# Patient Record
Sex: FEMALE | Race: WHITE | NOT HISPANIC OR LATINO | Employment: FULL TIME | URBAN - METROPOLITAN AREA
[De-identification: names, ages, dates, MRNs, and addresses within clinical notes are randomized per-mention and may not be internally consistent; named-entity substitution may affect disease eponyms.]

---

## 2018-03-29 LAB — RAPID GROUP A STREP (OHS): NEGATIVE

## 2019-04-16 ENCOUNTER — HISTORICAL (OUTPATIENT)
Dept: ADMINISTRATIVE | Facility: HOSPITAL | Age: 39
End: 2019-04-16

## 2019-04-16 LAB
ABS NEUT (OLG): 4.5 X10(3)/MCL (ref 2.1–9.2)
ALBUMIN SERPL-MCNC: 4.2 GM/DL (ref 3.4–5)
ALBUMIN/GLOB SERPL: 1.83 {RATIO} (ref 1.5–2.5)
ALP SERPL-CCNC: 43 UNIT/L (ref 38–126)
ALT SERPL-CCNC: 18 UNIT/L (ref 7–52)
APPEARANCE, UA: NORMAL
AST SERPL-CCNC: 18 UNIT/L (ref 15–37)
BACTERIA #/AREA URNS AUTO: NORMAL /HPF
BILIRUB SERPL-MCNC: 0.4 MG/DL (ref 0.2–1)
BILIRUB UR QL STRIP: NEGATIVE MG/DL
BILIRUBIN DIRECT+TOT PNL SERPL-MCNC: 0.1 MG/DL (ref 0–0.5)
BILIRUBIN DIRECT+TOT PNL SERPL-MCNC: 0.3 MG/DL
BUN SERPL-MCNC: 12 MG/DL (ref 7–18)
CALCIUM SERPL-MCNC: 8.7 MG/DL (ref 8.5–10)
CHLORIDE SERPL-SCNC: 105 MMOL/L (ref 98–107)
CHOLEST SERPL-MCNC: 197 MG/DL (ref 0–200)
CHOLEST/HDLC SERPL: 2.7 {RATIO}
CO2 SERPL-SCNC: 30 MMOL/L (ref 21–32)
COLOR UR: YELLOW
CREAT SERPL-MCNC: 0.61 MG/DL (ref 0.6–1.3)
ERYTHROCYTE [DISTWIDTH] IN BLOOD BY AUTOMATED COUNT: 13.3 % (ref 11.5–17)
GLOBULIN SER-MCNC: 2.3 GM/DL (ref 1.2–3)
GLUCOSE (UA): NEGATIVE MG/DL
GLUCOSE SERPL-MCNC: 100 MG/DL (ref 74–106)
HCT VFR BLD AUTO: 41.1 % (ref 37–47)
HDLC SERPL-MCNC: 73 MG/DL (ref 35–60)
HGB BLD-MCNC: 14 GM/DL (ref 12–16)
HGB UR QL STRIP: NEGATIVE UNIT/L
KETONES UR QL STRIP: NEGATIVE MG/DL
LDLC SERPL CALC-MCNC: 93 MG/DL (ref 0–129)
LEUKOCYTE ESTERASE UR QL STRIP: NEGATIVE UNIT/L
LYMPHOCYTES # BLD AUTO: 1.4 X10(3)/MCL (ref 0.6–3.4)
LYMPHOCYTES NFR BLD AUTO: 21.5 % (ref 13–40)
MCH RBC QN AUTO: 30.1 PG (ref 27–31.2)
MCHC RBC AUTO-ENTMCNC: 34 GM/DL (ref 32–36)
MCV RBC AUTO: 88 FL (ref 80–94)
MONOCYTES # BLD AUTO: 0.7 X10(3)/MCL (ref 0.1–1.3)
MONOCYTES NFR BLD AUTO: 10.4 % (ref 0.1–24)
NEUTROPHILS NFR BLD AUTO: 68.1 % (ref 47–80)
NITRITE UR QL STRIP.AUTO: NEGATIVE
PH UR STRIP: 7 [PH]
PLATELET # BLD AUTO: 253 X10(3)/MCL (ref 130–400)
PMV BLD AUTO: 8.8 FL (ref 9.4–12.4)
POTASSIUM SERPL-SCNC: 4.5 MMOL/L (ref 3.5–5.1)
PROT SERPL-MCNC: 6.5 GM/DL (ref 6.4–8.2)
PROT UR QL STRIP: NEGATIVE MG/DL
RBC # BLD AUTO: 4.65 X10(6)/MCL (ref 4.2–5.4)
RBC #/AREA URNS HPF: NORMAL /HPF
SODIUM SERPL-SCNC: 139 MMOL/L (ref 136–145)
SP GR UR STRIP: 1.02
SQUAMOUS EPITHELIAL, UA: NORMAL /LPF
TRIGL SERPL-MCNC: 94 MG/DL (ref 30–150)
TSH SERPL-ACNC: 0.7 MIU/ML (ref 0.35–4.94)
UROBILINOGEN UR STRIP-ACNC: 0.2 MG/DL
VLDLC SERPL CALC-MCNC: 18.8 MG/DL
WBC # SPEC AUTO: 6.6 X10(3)/MCL (ref 4.5–11.5)
WBC #/AREA URNS AUTO: NORMAL /[HPF]

## 2020-06-03 ENCOUNTER — HISTORICAL (OUTPATIENT)
Dept: ADMINISTRATIVE | Facility: HOSPITAL | Age: 40
End: 2020-06-03

## 2020-06-03 LAB
ABS NEUT (OLG): 3.8 X10(3)/MCL (ref 2.1–9.2)
ALBUMIN SERPL-MCNC: 4.2 GM/DL (ref 3.4–5)
ALBUMIN/GLOB SERPL: 1.83 {RATIO} (ref 1.5–2.5)
ALP SERPL-CCNC: 65 UNIT/L (ref 38–126)
ALT SERPL-CCNC: 38 UNIT/L (ref 7–52)
APPEARANCE, UA: CLEAR
AST SERPL-CCNC: 24 UNIT/L (ref 15–37)
BACTERIA #/AREA URNS AUTO: ABNORMAL /HPF
BILIRUB SERPL-MCNC: 0.6 MG/DL (ref 0.2–1)
BILIRUB UR QL STRIP: ABNORMAL MG/DL
BILIRUBIN DIRECT+TOT PNL SERPL-MCNC: 0.1 MG/DL (ref 0–0.5)
BILIRUBIN DIRECT+TOT PNL SERPL-MCNC: 0.5 MG/DL
BUN SERPL-MCNC: 13 MG/DL (ref 7–18)
CALCIUM SERPL-MCNC: 8.8 MG/DL (ref 8.5–10)
CHLORIDE SERPL-SCNC: 106 MMOL/L (ref 98–107)
CHOLEST SERPL-MCNC: 184 MG/DL (ref 0–200)
CHOLEST/HDLC SERPL: 2.5 {RATIO}
CO2 SERPL-SCNC: 29 MMOL/L (ref 21–32)
COLOR UR: ABNORMAL
CREAT SERPL-MCNC: 0.64 MG/DL (ref 0.6–1.3)
ERYTHROCYTE [DISTWIDTH] IN BLOOD BY AUTOMATED COUNT: 13.2 % (ref 11.5–17)
GLOBULIN SER-MCNC: 2.3 GM/DL (ref 1.2–3)
GLUCOSE (UA): NEGATIVE MG/DL
GLUCOSE SERPL-MCNC: 91 MG/DL (ref 74–106)
HCT VFR BLD AUTO: 39.8 % (ref 37–47)
HDLC SERPL-MCNC: 74 MG/DL (ref 35–60)
HGB BLD-MCNC: 13.3 GM/DL (ref 12–16)
HGB UR QL STRIP: ABNORMAL UNIT/L
KETONES UR QL STRIP: ABNORMAL MG/DL
LDLC SERPL CALC-MCNC: 89 MG/DL (ref 0–129)
LEUKOCYTE ESTERASE UR QL STRIP: NEGATIVE UNIT/L
LYMPHOCYTES # BLD AUTO: 1.4 X10(3)/MCL (ref 0.6–3.4)
LYMPHOCYTES NFR BLD AUTO: 23.8 % (ref 13–40)
MCH RBC QN AUTO: 29 PG (ref 27–31.2)
MCHC RBC AUTO-ENTMCNC: 33 GM/DL (ref 32–36)
MCV RBC AUTO: 87 FL (ref 80–94)
MONOCYTES # BLD AUTO: 0.5 X10(3)/MCL (ref 0.1–1.3)
MONOCYTES NFR BLD AUTO: 9.6 % (ref 0.1–24)
NEUTROPHILS NFR BLD AUTO: 66.6 % (ref 47–80)
NITRITE UR QL STRIP.AUTO: NEGATIVE
PH UR STRIP: 6 [PH]
PLATELET # BLD AUTO: 316 X10(3)/MCL (ref 130–400)
PMV BLD AUTO: 9.4 FL (ref 9.4–12.4)
POTASSIUM SERPL-SCNC: 4.3 MMOL/L (ref 3.5–5.1)
PROT SERPL-MCNC: 6.5 GM/DL (ref 6.4–8.2)
PROT UR QL STRIP: NEGATIVE MG/DL
RBC # BLD AUTO: 4.58 X10(6)/MCL (ref 4.2–5.4)
RBC #/AREA URNS HPF: ABNORMAL /HPF
SODIUM SERPL-SCNC: 140 MMOL/L (ref 136–145)
SP GR UR STRIP: >1.03
SQUAMOUS EPITHELIAL, UA: ABNORMAL /LPF
TRIGL SERPL-MCNC: 85 MG/DL (ref 30–150)
TSH SERPL-ACNC: 1.33 MIU/ML (ref 0.35–4.94)
UROBILINOGEN UR STRIP-ACNC: 0.2 MG/DL
VLDLC SERPL CALC-MCNC: 17 MG/DL
WBC # SPEC AUTO: 5.7 X10(3)/MCL (ref 4.5–11.5)
WBC #/AREA URNS AUTO: ABNORMAL /[HPF]

## 2020-11-16 LAB
ABS NEUT (OLG): 4.47 X10(3)/MCL (ref 2.1–9.2)
ALBUMIN SERPL-MCNC: 4.2 GM/DL (ref 3.5–5)
ALBUMIN/GLOB SERPL: 1.6 RATIO (ref 1.1–2)
ALP SERPL-CCNC: 69 UNIT/L (ref 40–150)
ALT SERPL-CCNC: 21 UNIT/L (ref 0–55)
AST SERPL-CCNC: 18 UNIT/L (ref 5–34)
B-HCG SERPL QL: NEGATIVE
BASOPHILS # BLD AUTO: 0 X10(3)/MCL (ref 0–0.2)
BASOPHILS NFR BLD AUTO: 0 %
BILIRUB SERPL-MCNC: 0.6 MG/DL
BILIRUBIN DIRECT+TOT PNL SERPL-MCNC: 0.2 MG/DL (ref 0–0.5)
BILIRUBIN DIRECT+TOT PNL SERPL-MCNC: 0.4 MG/DL (ref 0–0.8)
BUN SERPL-MCNC: 12.5 MG/DL (ref 7–18.7)
CALCIUM SERPL-MCNC: 8.9 MG/DL (ref 8.4–10.2)
CHLORIDE SERPL-SCNC: 107 MMOL/L (ref 98–107)
CO2 SERPL-SCNC: 25 MMOL/L (ref 22–29)
CREAT SERPL-MCNC: 0.78 MG/DL (ref 0.55–1.02)
EOSINOPHIL # BLD AUTO: 0.1 X10(3)/MCL (ref 0–0.9)
EOSINOPHIL NFR BLD AUTO: 1 %
ERYTHROCYTE [DISTWIDTH] IN BLOOD BY AUTOMATED COUNT: 13.4 % (ref 11.5–17)
GLOBULIN SER-MCNC: 2.6 GM/DL (ref 2.4–3.5)
GLUCOSE SERPL-MCNC: 96 MG/DL (ref 74–100)
GROUP & RH: NORMAL
HCT VFR BLD AUTO: 42 % (ref 37–47)
HGB BLD-MCNC: 13.5 GM/DL (ref 12–16)
LYMPHOCYTES # BLD AUTO: 1.1 X10(3)/MCL (ref 0.6–4.6)
LYMPHOCYTES NFR BLD AUTO: 17 %
MCH RBC QN AUTO: 28.8 PG (ref 27–31)
MCHC RBC AUTO-ENTMCNC: 32.1 GM/DL (ref 33–36)
MCV RBC AUTO: 89.6 FL (ref 80–94)
MONOCYTES # BLD AUTO: 0.5 X10(3)/MCL (ref 0.1–1.3)
MONOCYTES NFR BLD AUTO: 8 %
NEUTROPHILS # BLD AUTO: 4.47 X10(3)/MCL (ref 2.1–9.2)
NEUTROPHILS NFR BLD AUTO: 72 %
PLATELET # BLD AUTO: 286 X10(3)/MCL (ref 130–400)
PMV BLD AUTO: 10 FL (ref 9.4–12.4)
POTASSIUM SERPL-SCNC: 4.7 MMOL/L (ref 3.5–5.1)
PROT SERPL-MCNC: 6.8 GM/DL (ref 6.4–8.3)
RBC # BLD AUTO: 4.69 X10(6)/MCL (ref 4.2–5.4)
SODIUM SERPL-SCNC: 141 MMOL/L (ref 136–145)
WBC # SPEC AUTO: 6.2 X10(3)/MCL (ref 4.5–11.5)

## 2020-12-10 ENCOUNTER — HISTORICAL (OUTPATIENT)
Dept: SURGERY | Facility: HOSPITAL | Age: 40
End: 2020-12-10

## 2020-12-10 LAB — GROUP & RH: NORMAL

## 2021-07-01 ENCOUNTER — HISTORICAL (OUTPATIENT)
Dept: ADMINISTRATIVE | Facility: HOSPITAL | Age: 41
End: 2021-07-01

## 2021-07-01 LAB
ABS NEUT (OLG): 3.3 X10(3)/MCL (ref 2.1–9.2)
ALBUMIN SERPL-MCNC: 4.3 GM/DL (ref 3.4–5)
ALBUMIN/GLOB SERPL: 1.79 {RATIO} (ref 1.5–2.5)
ALP SERPL-CCNC: 63 UNIT/L (ref 38–126)
ALT SERPL-CCNC: 45 UNIT/L (ref 7–52)
APPEARANCE, UA: CLEAR
AST SERPL-CCNC: 23 UNIT/L (ref 15–37)
BACTERIA #/AREA URNS AUTO: ABNORMAL /HPF
BILIRUB SERPL-MCNC: 0.6 MG/DL (ref 0.2–1)
BILIRUB UR QL STRIP: NEGATIVE MG/DL
BILIRUBIN DIRECT+TOT PNL SERPL-MCNC: 0.1 MG/DL (ref 0–0.5)
BILIRUBIN DIRECT+TOT PNL SERPL-MCNC: 0.5 MG/DL
BUN SERPL-MCNC: 11 MG/DL (ref 7–18)
CALCIUM SERPL-MCNC: 8.9 MG/DL (ref 8.5–10)
CHLORIDE SERPL-SCNC: 106 MMOL/L (ref 98–107)
CHOLEST SERPL-MCNC: 189 MG/DL (ref 0–200)
CHOLEST/HDLC SERPL: 2.4 {RATIO}
CO2 SERPL-SCNC: 27 MMOL/L (ref 21–32)
COLOR UR: YELLOW
CREAT SERPL-MCNC: 0.57 MG/DL (ref 0.6–1.3)
ERYTHROCYTE [DISTWIDTH] IN BLOOD BY AUTOMATED COUNT: 12.9 % (ref 11.5–17)
GLOBULIN SER-MCNC: 2 GM/DL (ref 1.2–3)
GLUCOSE (UA): NEGATIVE MG/DL
GLUCOSE SERPL-MCNC: 100 MG/DL (ref 74–106)
HCT VFR BLD AUTO: 41.3 % (ref 37–47)
HDLC SERPL-MCNC: 78 MG/DL (ref 35–60)
HGB BLD-MCNC: 13.5 GM/DL (ref 12–16)
HGB UR QL STRIP: ABNORMAL UNIT/L
KETONES UR QL STRIP: NEGATIVE MG/DL
LDLC SERPL CALC-MCNC: 85 MG/DL (ref 0–129)
LEUKOCYTE ESTERASE UR QL STRIP: NEGATIVE UNIT/L
LYMPHOCYTES # BLD AUTO: 1.4 X10(3)/MCL (ref 0.6–3.4)
LYMPHOCYTES NFR BLD AUTO: 26.6 % (ref 13–40)
MCH RBC QN AUTO: 29.1 PG (ref 27–31.2)
MCHC RBC AUTO-ENTMCNC: 33 GM/DL (ref 32–36)
MCV RBC AUTO: 89 FL (ref 80–94)
MONOCYTES # BLD AUTO: 0.5 X10(3)/MCL (ref 0.1–1.3)
MONOCYTES NFR BLD AUTO: 9.1 % (ref 0.1–24)
NEUTROPHILS NFR BLD AUTO: 64.3 % (ref 47–80)
NITRITE UR QL STRIP.AUTO: NEGATIVE
PH UR STRIP: 6 [PH]
PLATELET # BLD AUTO: 264 X10(3)/MCL (ref 130–400)
PMV BLD AUTO: 9.6 FL (ref 9.4–12.4)
POTASSIUM SERPL-SCNC: 4.1 MMOL/L (ref 3.5–5.1)
PROT SERPL-MCNC: 6.7 GM/DL (ref 6.4–8.2)
PROT UR QL STRIP: NEGATIVE MG/DL
RBC # BLD AUTO: 4.64 X10(6)/MCL (ref 4.2–5.4)
RBC #/AREA URNS HPF: ABNORMAL /HPF
SODIUM SERPL-SCNC: 140 MMOL/L (ref 136–145)
SP GR UR STRIP: 1.02
SQUAMOUS EPITHELIAL, UA: ABNORMAL /LPF
TRIGL SERPL-MCNC: 74 MG/DL (ref 30–150)
TSH SERPL-ACNC: 1.35 MIU/ML (ref 0.35–4.94)
UROBILINOGEN UR STRIP-ACNC: 0.2 MG/DL
VLDLC SERPL CALC-MCNC: 14.8 MG/DL
WBC # SPEC AUTO: 5.2 X10(3)/MCL (ref 4.5–11.5)
WBC #/AREA URNS AUTO: ABNORMAL /[HPF]

## 2022-04-11 ENCOUNTER — HISTORICAL (OUTPATIENT)
Dept: ADMINISTRATIVE | Facility: HOSPITAL | Age: 42
End: 2022-04-11

## 2022-04-28 VITALS
WEIGHT: 273.13 LBS | DIASTOLIC BLOOD PRESSURE: 85 MMHG | OXYGEN SATURATION: 98 % | HEIGHT: 68 IN | SYSTOLIC BLOOD PRESSURE: 126 MMHG | BODY MASS INDEX: 41.39 KG/M2

## 2022-04-30 NOTE — OP NOTE
Patient:   Margarito Krishna             MRN: [id number]            FIN: [id number]               Age:   40 years     Sex:  Female     :  1980   Associated Diagnoses:   None   Author:   Edmond Johansen MD      Pre-op Diagnosis: Menorrhagia, dysmenorrhea  Postop Diagnosis: Same, Adhesion of colon to posterior uterus  Procedure: Total Laparoscopic Hysterectomy, Bilateral salpingectomy, cystoscopy  Surgeon: Edmond Johansen MD  Assistant: Yoselyn Gresham MD  Anesthesia: GETA  Complications: None  EBL:  75 mL   Urine Output: 800 mL  Fluids:  mL  Findings: 9 week sized uterus with posterior adhesion to the colon at the site of previous myomectomy, Firm, enlarged right fallopian tube, normal left tube.  Normal ovaries. Normal pelvic peritoneum, cul de sac, without evidence of endometriosis. Normal visualized portion of the bowel. Upon cystoscopy, normal efflux of clear urine bilaterally, Normal bladder without evidence of foreign body.  Specimen: Uterus, fallopian tubes, cervix  Consent: The patient understood that the risks of  section include, but are not limited to, visceral or vascular injury, infection, blood loss and the need for transfusion, prolonged hospitalization and reoperation.  The patient stated understanding and desired to proceed.  All questions were answered.     Procedure: With informed consent the patient was brought to the operative suite and placed in the supine position.  General endotracheal anesthesia was administered.  She was placed in adjustable stirrups in the lithotomy position.  The abdomen vagina and perineum were prepped and draped in the usual fashion.  A timeout was performed and the procedure was verified.  With double gloves a weighted speculum was placed into the vagina and the cervix was visualized.  The uterus was sounded to 10 cm and an 8 cm tip was selected for the Corine uterine manipulator.  Sutures were placed at 3 and 9 o'clock position on the cervix and passed  through the KOH ring.  The tip of the Corine was placed into the uterus and the balloon inflated.  The vaginal occluder balloon was also inflated.  A Malin catheter was inserted using aseptic technique.  The legs were lowered and over gloves were removed.  Attention was turned to the abdomen where a 5 mm incision was made superior to the umbilicus after injecting with half percent Marcaine.  Using an optical trocar, the 5 mm scope was inserted into the peritoneal cavity under direct visualization and carbon dioxide pneumoperitoneum was achieved to a pressure of 15 mm Hg.  A lower left lateral 5 mm and an upper left lateral 11 mm port was placed under direct visualization.  The lower right 5 mm port was then placed under direct visualization.  There was no concern of injury to underlying structures.  The patient was placed in Trendelenburg position and the bowels were swept out of the pelvis after dissection of the adhesion from the psterior uterus with the harmonic scalpel.  The left mesosalpinx was transected using the Harmonic scalpel followed by the ovarian ligament.  Working anteriorly the posterior leaf of the broad ligament and round ligament were transected.  Dissection continued down the anterior leaf of the broad ligament creating a bladder flap anteriorly.  The uterine artery and vein were identified and cauterized and transected.  In a similar manner on the right, the mesosalpinx, round ligament, and broad ligament were dissected using the Harmonic scalpel.  The bladder flap was completed on the anterior side and then the uterine artery and vein were cauterized using bipolar cautery and transected with Harmonic scalpel.  The koh ring was identified in the anterior midline and after ensuring that the bladder was dissected inferior to the cervicovaginal junction, a colpotomy was performed.  The dissection was continued circumferentially and the specimen was removed vaginally.  The vaginal occluder was placed  back into the vagina to maintain pneumoperitoneum.  The vaginal cuff was then reapproximated using and 2-0 stratafix suture.  The repair was done in 2 layers, initially reapproximating the mucosa followed by the muscularis and fascia.  Bipolar cautery was used to achieve hemostasis from the left uteine artery which was bleeding. Irrigation was performed and clots were suctioned.  The right pericolic gutter was inspected and noted to be normal.  The Dragan Thomasirasema fascial closure device was employed for fascial approximation at the site of the 11 mm port.  The pneumoperitoneum was allowed to escape and the remainder of the sleeves were removed.  Cystoscopy was performed which showed bilateral efflux of clear urine and absence of foreign material in the bladder.  The laparoscopic incisions were reapproximated with 4-0 Vicryl in a subcuticular fashion.  The wounds were dressed with gauze and Tegaderm, and the patient was awakened and transported to recovery in stable condition

## 2022-05-04 NOTE — HISTORICAL [facility]ERNER
This is a historical note converted from [place]. Formatting and pictures may have been removed.  Please reference Rol[facility] for original formatting and attached multimedia. History of Present Illness  Scheduled for TLH, bilateral salpingectomy 12/10/20?for increasingly heavier and painful periods. She was rescheduled from 11/19 due to COVID exposure. Denies current symptoms. ? Periods are?regular.? She has a history of endometriosis diagnosed at age 16, uterine fibroid?and a myomectomy in 2015.? She was told there was evidence of endometriosis at the time of the myomectomy.? She declines medical management and desires to proceed with hysterectomy.  Consent reviewed in detail, questions encouraged and answered, consent signed.  Review of Systems  Negative except as above  Physical Exam  Vitals & Measurements  T:?36.9? ?C (Oral)? HR:?114(Peripheral)? RR:?18? BP:?149/106? SpO2:?98%? WT:?127.8?kg? BMI:?43.2?  ?Constitutional:  General Appearance : alert, in no acute distress, normal, well nourished.  Neck/Thyroid:  Inspection/Palpation: normal. Thyroid: normal size and shape.  Cor:  Regular rate and rhythm. No murmur, gallop, rub.  Respiratory:  Auscultation: clear to auscultation bilaterally. Respiratory Effort: normal.  Breast:  Exam negative last month  Gastrointestinal:  Abdomen: no masses. no tender, nondistended.  Liver and spleen: normal  Hernias: no hernias present, no inguinal adenopathy.  Genitourinary:  External Genitalia: normal, no lesions.  Vagina: normal appearance, no abnormal discharge, no lesions.  Bladder: no mass, nontender.  Urethra: no erythema or lesions present.  Cervix: no lesions, non tender.  Uterus: nontender, normal contour, normal mobility, normal size but difficult to examine due to abdominal girth  Adnexa: no masses, no tenderness.  Anus and Perineum: visually normal.?  Assessment/Plan  1.?Heavy menstrual bleeding?N92.0  Discussed procedure in detail - TLH, bilateral salpingectomy, possible  oophorectomy, possible laparotomy.  ?Ordered:  acetaminophen-oxyCODONE, 1 tab(s), Oral, q4hr, PRN PRN as needed for pain, # 20 tab(s), 0 Refill(s)  Clinic Follow up, *Est. 12/18/20 3:00:00 CST, Order for future visit, Heavy menstrual bleeding  Office/Outpatient Visit Level 2 Established [facility], Heavy menstrual bleeding, 11/06/20 8:22:00 CST    Problem List/Past Medical History  Ongoing  Anxiety  Menorrhagia  Migraine  Obesity  Staphylococcal infection  Historical  Boil, buttock  Endometriosis  Incontinence  Left ovarian cyst  Pregnant  Right otitis externa  Uterine fibroids  Wellness examination  Procedure/Surgical History  carpal tunnel sx (06/16/2020)  removal of tumor in right thumb (06/16/2020)  Colonoscopy (04/17/2015)  Myomectomy (2015)  Tonsillectomy (2006)  CYST REMOVED LEFT LAPARASCOPIC   Medications  Inpatient  Ancef, 2 gm= 50 mL, IV Piggyback, On Call  Lactated Ringers Injection intravenous solution 1,000 mL, 1000 mL, IV  Home  buPROPion 150 mg/24 hours (XL) oral tablet, extended release, See Instructions, 3 refills  Zofran ODT 8 mg oral tablet, disintegrating, 8 mg= 1 tab(s), Oral, TID, PRN,? ?Investigating: will take post op  Allergies  Adhesive Bandage?(Rash)  Lortab?(Vomiting)  Social History  Abuse/Neglect  No, 12/10/2020  No, No, Yes, 11/17/2020  No, No, Yes, 10/16/2020  Alcohol  Current, Beer, 1-2 times per month, 11/17/2020  Current, Beer, Wine, 1-2 times per month, 11/06/2020  Employment/School  Employed, Work/School description: ., 04/11/2019  Exercise  Home/Environment  Lives with Children, Spouse., 04/11/2019  Nutrition/Health  Regular, Good, 10/16/2020  Sexual  Sexually active: Yes. 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  Colonic polyp: Father.  Diabetes: Father.  Hypercholesterolemia: Father.  Hypertension: Mother and Father.  Liver disease: Brother.  Renal cyst: Mother.  Immunizations  Vaccine Date Status   influenza virus vaccine, inactivated 10/21/2019 Given   influenza virus vaccine, inactivated 12/14/2016 Recorded   tetanus/diphth/pertuss (Tdap) adult/adol 04/27/2011 Recorded

## 2022-09-21 ENCOUNTER — HISTORICAL (OUTPATIENT)
Dept: ADMINISTRATIVE | Facility: HOSPITAL | Age: 42
End: 2022-09-21
Payer: COMMERCIAL

## 2022-10-27 ENCOUNTER — OFFICE VISIT (OUTPATIENT)
Dept: GYNECOLOGY | Facility: CLINIC | Age: 42
End: 2022-10-27
Payer: COMMERCIAL

## 2022-10-27 VITALS
RESPIRATION RATE: 18 BRPM | TEMPERATURE: 98 F | SYSTOLIC BLOOD PRESSURE: 140 MMHG | BODY MASS INDEX: 43.35 KG/M2 | HEIGHT: 68 IN | WEIGHT: 286 LBS | HEART RATE: 90 BPM | OXYGEN SATURATION: 95 % | DIASTOLIC BLOOD PRESSURE: 88 MMHG

## 2022-10-27 DIAGNOSIS — R03.0 ELEVATED BLOOD PRESSURE READING: ICD-10-CM

## 2022-10-27 DIAGNOSIS — Z01.419 ROUTINE GYNECOLOGICAL EXAMINATION: Primary | ICD-10-CM

## 2022-10-27 DIAGNOSIS — Z12.31 VISIT FOR SCREENING MAMMOGRAM: ICD-10-CM

## 2022-10-27 PROCEDURE — 3079F DIAST BP 80-89 MM HG: CPT | Mod: CPTII,,, | Performed by: OBSTETRICS & GYNECOLOGY

## 2022-10-27 PROCEDURE — 99396 PREV VISIT EST AGE 40-64: CPT | Mod: S$PBB,,, | Performed by: OBSTETRICS & GYNECOLOGY

## 2022-10-27 PROCEDURE — 99396 PR PREVENTIVE VISIT,EST,40-64: ICD-10-PCS | Mod: S$PBB,,, | Performed by: OBSTETRICS & GYNECOLOGY

## 2022-10-27 PROCEDURE — 1159F MED LIST DOCD IN RCRD: CPT | Mod: CPTII,,, | Performed by: OBSTETRICS & GYNECOLOGY

## 2022-10-27 PROCEDURE — 3079F PR MOST RECENT DIASTOLIC BLOOD PRESSURE 80-89 MM HG: ICD-10-PCS | Mod: CPTII,,, | Performed by: OBSTETRICS & GYNECOLOGY

## 2022-10-27 PROCEDURE — 1159F PR MEDICATION LIST DOCUMENTED IN MEDICAL RECORD: ICD-10-PCS | Mod: CPTII,,, | Performed by: OBSTETRICS & GYNECOLOGY

## 2022-10-27 PROCEDURE — 1160F RVW MEDS BY RX/DR IN RCRD: CPT | Mod: CPTII,,, | Performed by: OBSTETRICS & GYNECOLOGY

## 2022-10-27 PROCEDURE — 3077F SYST BP >= 140 MM HG: CPT | Mod: CPTII,,, | Performed by: OBSTETRICS & GYNECOLOGY

## 2022-10-27 PROCEDURE — 3077F PR MOST RECENT SYSTOLIC BLOOD PRESSURE >= 140 MM HG: ICD-10-PCS | Mod: CPTII,,, | Performed by: OBSTETRICS & GYNECOLOGY

## 2022-10-27 PROCEDURE — 1160F PR REVIEW ALL MEDS BY PRESCRIBER/CLIN PHARMACIST DOCUMENTED: ICD-10-PCS | Mod: CPTII,,, | Performed by: OBSTETRICS & GYNECOLOGY

## 2022-10-27 PROCEDURE — 99215 OFFICE O/P EST HI 40 MIN: CPT | Mod: PBBFAC | Performed by: OBSTETRICS & GYNECOLOGY

## 2022-10-27 RX ORDER — AZELASTINE 1 MG/ML
SPRAY, METERED NASAL
COMMUNITY
Start: 2021-10-22 | End: 2023-04-13

## 2022-10-27 RX ORDER — ESCITALOPRAM OXALATE 10 MG/1
TABLET ORAL
COMMUNITY
Start: 2022-01-30 | End: 2023-04-13 | Stop reason: SDUPTHER

## 2022-10-27 NOTE — PROGRESS NOTES
Subjective:      Margarito Krishna is a 42 y.o. female who presents for an annual exam.     She reports her periods are: {Period Status:82415}    Complains of vaginitis {YES (DEF)/NO:88033}    Hot Flashes: {gen none/mild/moderate/severe:332310}    Colonoscopy Due: {DUE:62628}    Last Dexa: {[facility] DEXA SCORE:46210}    Review of Systems  {ros; complete:30394}      Objective:      {exam; complete:96460}.           Assessment:     No diagnosis found.       Plan:       {gyn plan:12507}     No orders of the defined types were placed in this encounter.

## 2022-10-27 NOTE — PROGRESS NOTES
Subjective:       Patient ID: Margarito Krishna is a 42 y.o. female.    Chief Complaint:  Well Woman    History of Present Illness:  Presents for gyn wellness visit without complaints.  Periods are absent. She denies abnormal bleeding, vaginal discharge, breast masses or other changes of concern.     GYN & OB History  No LMP recorded (exact date). Patient has had a hysterectomy.   Date of Last Pap: No result found    OB History    Para Term  AB Living   1 1       1   SAB IAB Ectopic Multiple Live Births           1      # Outcome Date GA Lbr Mike/2nd Weight Sex Delivery Anes PTL Lv   1 Para      Vag-Spont   SLICK       Vitals:    10/27/22 1441   BP: (!) 140/88   Pulse: 90   Resp: 18   Temp: 98.2 °F (36.8 °C)        Past Surgical History:   Procedure Laterality Date    CARPAL TUNNEL RELEASE  2020    COLONOSCOPY  2020    repeat 3 years, Dr. Rodríguez    HYSTERECTOMY      LAPAROSCOPIC SURGICAL REMOVAL OF CYST OF OVARY Left     LAPAROSCOPIC TOTAL HYSTERECTOMY  2020    MYOMECTOMY  2015    TONSILLECTOMY  2006    TUMOR EXCISION  2020    from right thumb        Current Outpatient Medications:     azelastine (ASTELIN) 137 mcg (0.1 %) nasal spray, by Nasal route., Disp: , Rfl:     EScitalopram oxalate (LEXAPRO) 10 MG tablet,  See Instructions, TAKE 1 TABLET BY MOUTH EVERY DAY, # 90 tab(s), 1 Refill(s), Pharmacy: [id number], 172, cm, Height/Length Dosing, 10/22/21 15:09:00 CDT, 123.9, kg, Weight Dosing, 10/22/21 15:09:00 CDT, Disp: , Rfl:      Review of patient's allergies indicates:   Allergen Reactions    Adhesive Rash    Hydrocodone-acetaminophen Nausea And Vomiting        Social History     Socioeconomic History    Marital status:    Tobacco Use    Smoking status: Never    Smokeless tobacco: Never   Substance and Sexual Activity    Alcohol use: Yes     Comment: occasionally    Drug use: Never    Sexual activity: Yes     Partners: Male        Immunization History   Administered  Date(s) Administered    COVID-19, MRNA, LN-S, PF (Pfizer) (Purple Cap) 03/02/2021, 03/23/2021    Influenza - Quadrivalent 11/22/2017, 01/22/2021, 01/22/2021    Influenza - Quadrivalent - PF *Preferred* (6 months and older) 12/14/2016, 10/21/2019    Influenza - Trivalent - PF (ADULT) 10/21/2019    Pneumococcal Polysaccharide - 23 Valent 10/24/2011    Tdap 04/27/2011, 07/01/2021        There are no preventive care reminders to display for this patient.     Review of Systems  Review of Systems        Objective:    Physical Exam:   Constitutional: She is oriented to person, place, and time. She appears well-developed and well-nourished.    HENT:   Head: Normocephalic and atraumatic.    Eyes: Pupils are equal, round, and reactive to light. Conjunctivae and EOM are normal.    Neck: No thyromegaly present.    Cardiovascular:  Normal rate, regular rhythm and normal heart sounds.            No murmur heard.   Pulmonary/Chest: Effort normal and breath sounds normal. She has no wheezes. She has no rales.        Abdominal: Soft. She exhibits no distension and no mass. There is no abdominal tenderness. No hernia.     Genitourinary:    Inguinal canal, right adnexa and left adnexa normal.   Rectum:      No external hemorrhoid.   The external female genitalia was normal.   Labial bartholins normal.There is no lesion on the right labia. There is no lesion on the left labia. Vagina exhibits no lesion. Vaginal cuff normal.  No erythema,  no vaginal discharge, tenderness, rectocele or cystocele in the vagina. Cervix is absent.Uterus is absent. Normal urethral meatus.Bladder findings: no bladder distention and no bladder tenderness             Lymphadenopathy:     She has no cervical adenopathy.    Neurological: She is alert and oriented to person, place, and time.    Skin: Skin is warm and dry. No rash noted.    Psychiatric: She has a normal mood and affect. Her behavior is normal.        Assessment:        1. Routine gynecological  examination    2. Visit for screening mammogram    3. Elevated blood pressure reading                Plan:      Problem List Items Addressed This Visit    None  Visit Diagnoses       Routine gynecological examination    -  Primary    Visit for screening mammogram        Relevant Orders    Mammo Digital Screening Bilat w/ Talha    Elevated blood pressure reading                   Follow up in 1 year (on 10/27/2023).   SBE      Advised to check home BP and notify Dr Pate if elevated.

## 2023-04-12 PROBLEM — F41.9 ANXIETY: Status: ACTIVE | Noted: 2023-04-12

## 2023-04-12 PROBLEM — Z23 IMMUNIZATION DUE: Status: ACTIVE | Noted: 2023-04-12

## 2023-04-12 PROBLEM — Z00.00 ENCOUNTER FOR WELLNESS EXAMINATION IN ADULT: Status: ACTIVE | Noted: 2023-04-12

## 2023-04-12 PROBLEM — E66.9 OBESITY: Status: ACTIVE | Noted: 2023-04-12

## 2023-04-27 ENCOUNTER — TELEPHONE (OUTPATIENT)
Dept: GYNECOLOGY | Facility: CLINIC | Age: 43
End: 2023-04-27
Payer: COMMERCIAL

## 2023-04-27 DIAGNOSIS — N64.4 BREAST PAIN, LEFT: Primary | ICD-10-CM

## 2023-04-27 NOTE — TELEPHONE ENCOUNTER
Patient called and is stating that she needs a new order placed for her mammogram because she is now experiencing left breast pain. She already has her MMG scheduled at [facility], but in order for her to be seen sooner [facility] is saying that she needs an updated order for an diagnostic mammogram.     Please advise. Thank you.

## 2023-07-17 PROBLEM — Z00.00 ENCOUNTER FOR WELLNESS EXAMINATION IN ADULT: Status: RESOLVED | Noted: 2023-04-12 | Resolved: 2023-07-17

## 2023-11-02 ENCOUNTER — OFFICE VISIT (OUTPATIENT)
Dept: GYNECOLOGY | Facility: CLINIC | Age: 43
End: 2023-11-02
Payer: COMMERCIAL

## 2023-11-02 VITALS
HEART RATE: 97 BPM | TEMPERATURE: 98 F | WEIGHT: 287 LBS | HEIGHT: 68 IN | OXYGEN SATURATION: 98 % | SYSTOLIC BLOOD PRESSURE: 133 MMHG | DIASTOLIC BLOOD PRESSURE: 86 MMHG | RESPIRATION RATE: 18 BRPM | BODY MASS INDEX: 43.5 KG/M2

## 2023-11-02 DIAGNOSIS — Z12.31 VISIT FOR SCREENING MAMMOGRAM: ICD-10-CM

## 2023-11-02 DIAGNOSIS — Z01.419 ROUTINE GYNECOLOGICAL EXAMINATION: Primary | ICD-10-CM

## 2023-11-02 PROCEDURE — 3075F SYST BP GE 130 - 139MM HG: CPT | Mod: CPTII,,, | Performed by: OBSTETRICS & GYNECOLOGY

## 2023-11-02 PROCEDURE — 1159F PR MEDICATION LIST DOCUMENTED IN MEDICAL RECORD: ICD-10-PCS | Mod: CPTII,,, | Performed by: OBSTETRICS & GYNECOLOGY

## 2023-11-02 PROCEDURE — 3044F HG A1C LEVEL LT 7.0%: CPT | Mod: CPTII,,, | Performed by: OBSTETRICS & GYNECOLOGY

## 2023-11-02 PROCEDURE — 3079F DIAST BP 80-89 MM HG: CPT | Mod: CPTII,,, | Performed by: OBSTETRICS & GYNECOLOGY

## 2023-11-02 PROCEDURE — 99396 PREV VISIT EST AGE 40-64: CPT | Mod: S$PBB,,, | Performed by: OBSTETRICS & GYNECOLOGY

## 2023-11-02 PROCEDURE — 1159F MED LIST DOCD IN RCRD: CPT | Mod: CPTII,,, | Performed by: OBSTETRICS & GYNECOLOGY

## 2023-11-02 PROCEDURE — 3008F BODY MASS INDEX DOCD: CPT | Mod: CPTII,,, | Performed by: OBSTETRICS & GYNECOLOGY

## 2023-11-02 PROCEDURE — 3075F PR MOST RECENT SYSTOLIC BLOOD PRESS GE 130-139MM HG: ICD-10-PCS | Mod: CPTII,,, | Performed by: OBSTETRICS & GYNECOLOGY

## 2023-11-02 PROCEDURE — 99396 PR PREVENTIVE VISIT,EST,40-64: ICD-10-PCS | Mod: S$PBB,,, | Performed by: OBSTETRICS & GYNECOLOGY

## 2023-11-02 PROCEDURE — 3079F PR MOST RECENT DIASTOLIC BLOOD PRESSURE 80-89 MM HG: ICD-10-PCS | Mod: CPTII,,, | Performed by: OBSTETRICS & GYNECOLOGY

## 2023-11-02 PROCEDURE — 3008F PR BODY MASS INDEX (BMI) DOCUMENTED: ICD-10-PCS | Mod: CPTII,,, | Performed by: OBSTETRICS & GYNECOLOGY

## 2023-11-02 PROCEDURE — 1160F PR REVIEW ALL MEDS BY PRESCRIBER/CLIN PHARMACIST DOCUMENTED: ICD-10-PCS | Mod: CPTII,,, | Performed by: OBSTETRICS & GYNECOLOGY

## 2023-11-02 PROCEDURE — 99214 OFFICE O/P EST MOD 30 MIN: CPT | Mod: PBBFAC | Performed by: OBSTETRICS & GYNECOLOGY

## 2023-11-02 PROCEDURE — 3044F PR MOST RECENT HEMOGLOBIN A1C LEVEL <7.0%: ICD-10-PCS | Mod: CPTII,,, | Performed by: OBSTETRICS & GYNECOLOGY

## 2023-11-02 PROCEDURE — 1160F RVW MEDS BY RX/DR IN RCRD: CPT | Mod: CPTII,,, | Performed by: OBSTETRICS & GYNECOLOGY

## 2023-11-02 RX ORDER — OMEPRAZOLE 40 MG/1
40 CAPSULE, DELAYED RELEASE ORAL
COMMUNITY
Start: 2023-10-19

## 2023-11-02 NOTE — PROGRESS NOTES
Subjective:       Patient ID: Margarito Kirshna is a 43 y.o. female.    Chief Complaint:  Well Woman    History of Present Illness:  Presents for gyn wellness visit without complaints.  Periods are absent. She denies vaginal discharge, breast masses or other changes of concern.     GYN & OB History  No LMP recorded. Patient has had a hysterectomy.   Date of Last Pap: No result found    OB History    Para Term  AB Living   1 1       1   SAB IAB Ectopic Multiple Live Births           1      # Outcome Date GA Lbr Mike/2nd Weight Sex Delivery Anes PTL Lv   1 Para      Vag-Spont   SLICK       Past Medical History:   Diagnosis Date    Anxiety disorder, unspecified     Cyst of left ovary     Endometriosis, unspecified       Past Surgical History:   Procedure Laterality Date    CARPAL TUNNEL RELEASE Right 2020    COLONOSCOPY  2020    repeat 3 years, Dr. Rodríguez    COLONOSCOPY  10/19/2023    LAPAROSCOPIC SURGICAL REMOVAL OF CYST OF OVARY Left     LAPAROSCOPIC TOTAL HYSTERECTOMY  2020    LAPAROSCOPY  1998    due to endometriosis    MYOMECTOMY      TONSILLECTOMY  2006    TUMOR EXCISION  2020    from right thumb        Current Outpatient Medications:     EScitalopram oxalate (LEXAPRO) 10 MG tablet, See Instructions, TAKE 1 TABLET BY MOUTH EVERY DAY, # 90 tab(s), 1 Refill(s), Pharmacy: [id number], 172, cm, Height/Length Dosing, 10/22/21 15:09:00 CDT, 123.9, kg, Weight Dosing, 10/22/21 15:09:00 CDT, Disp: 90 tablet, Rfl: 3    omeprazole (PRILOSEC) 40 MG capsule, Take 40 mg by mouth., Disp: , Rfl:      Review of patient's allergies indicates:   Allergen Reactions    Adhesive Rash    Hydrocodone-acetaminophen Nausea And Vomiting        Social History     Socioeconomic History    Marital status:     Number of children: 1   Occupational History    Occupation: Teacher   Tobacco Use    Smoking status: Never    Smokeless tobacco: Never   Substance and Sexual Activity    Alcohol use:  Yes     Comment: occasionally    Drug use: Never    Sexual activity: Yes     Partners: Male        Immunization History   Administered Date(s) Administered    COVID-19, MRNA, LN-S, PF (Pfizer) (Purple Cap) 03/02/2021, 03/23/2021    Influenza - Quadrivalent 11/22/2017, 01/22/2021, 01/22/2021    Influenza - Quadrivalent - PF *Preferred* (6 months and older) 12/14/2016, 10/21/2019    Influenza - Trivalent - PF (ADULT) 10/21/2019    Pneumococcal Polysaccharide - 23 Valent 10/24/2011    Tdap 04/27/2011, 07/01/2021        There are no preventive care reminders to display for this patient.     Review of Systems  Review of Systems        Objective:     Vitals:    11/02/23 1445   BP: 133/86   Pulse: 97   Resp: 18   Temp: 98 °F (36.7 °C)       Physical Exam:   Constitutional: She is oriented to person, place, and time. She appears well-developed and well-nourished.    HENT:   Head: Normocephalic and atraumatic.    Eyes: Pupils are equal, round, and reactive to light. Conjunctivae and EOM are normal.    Neck: No thyromegaly present.    Cardiovascular:  Normal rate, regular rhythm and normal heart sounds.            No murmur heard.   Pulmonary/Chest: Effort normal and breath sounds normal. She has no wheezes. She has no rales.        Abdominal: Soft. She exhibits no distension and no mass. There is no abdominal tenderness. No hernia.     Genitourinary:    Inguinal canal, right adnexa and left adnexa normal.   Rectum:      No external hemorrhoid.   The external female genitalia was normal.   Labial bartholins normal.There is no lesion on the right labia. There is no lesion on the left labia. Vagina exhibits no lesion. Vaginal cuff normal.  No erythema,  no vaginal discharge, tenderness, rectocele or cystocele in the vagina. Cervix is absent.Uterus is absent. Normal urethral meatus.Bladder findings: no bladder distention and no bladder tenderness             Lymphadenopathy:     She has no cervical adenopathy.    Neurological:  She is alert and oriented to person, place, and time.    Skin: Skin is warm and dry. No rash noted.    Psychiatric: She has a normal mood and affect. Her behavior is normal.          Assessment:        1. Routine gynecological examination    2. Visit for screening mammogram                Plan:      Problem List Items Addressed This Visit    None  Visit Diagnoses       Routine gynecological examination    -  Primary    Visit for screening mammogram        Relevant Orders    Mammo Digital Screening Bilat w/ Tal[facility]               No follow-ups on file.   SBE

## 2024-06-18 DIAGNOSIS — M25.561 RIGHT KNEE PAIN: Primary | ICD-10-CM

## 2024-07-10 ENCOUNTER — OFFICE VISIT (OUTPATIENT)
Dept: ORTHOPEDICS | Facility: CLINIC | Age: 44
End: 2024-07-10
Payer: COMMERCIAL

## 2024-07-10 ENCOUNTER — HOSPITAL ENCOUNTER (OUTPATIENT)
Dept: RADIOLOGY | Facility: CLINIC | Age: 44
Discharge: HOME | End: 2024-07-10
Attending: ORTHOPAEDIC SURGERY
Payer: COMMERCIAL

## 2024-07-10 VITALS
SYSTOLIC BLOOD PRESSURE: 155 MMHG | HEIGHT: 68 IN | DIASTOLIC BLOOD PRESSURE: 106 MMHG | WEIGHT: 293 LBS | HEART RATE: 98 BPM | BODY MASS INDEX: 44.41 KG/M2

## 2024-07-10 DIAGNOSIS — M25.561 RIGHT KNEE PAIN: ICD-10-CM

## 2024-07-10 DIAGNOSIS — M17.11 PRIMARY OSTEOARTHRITIS OF RIGHT KNEE: ICD-10-CM

## 2024-07-10 PROCEDURE — 73564 X-RAY EXAM KNEE 4 OR MORE: CPT | Mod: RT,,, | Performed by: ORTHOPAEDIC SURGERY

## 2024-07-10 RX ORDER — MELOXICAM 15 MG/1
15 TABLET ORAL DAILY
Qty: 30 TABLET | Refills: 3 | Status: SHIPPED | OUTPATIENT
Start: 2024-07-10

## 2024-07-10 NOTE — PROGRESS NOTES
Chief Complaint:   Chief Complaint   Patient presents with    Appointment     Patient here today for 2nd opinion on right knee. She says she thinks she tore her acl 04/2024 was seen at [facility] however that appointment did not go well as the office was a little chaotic with their loss of Dr. Eaton around that time. Surgery was recommended but patient says provider notes don't match with what her and provider spoke about. She has preciously tried visco injections and steroid injections. Those only helped a little while. She had steroid inj last right knee 04/2024.last visco 2022.     Pain     She is currently in physical therapy for the knee pain twice a week at [facility]  since April 2024. She says that she thinks that she has dislocated her knee a few times over the last few years.        Consulting Physician: Cecilia Villeda DC    History of present illness:    she is a pleasant 43 y.o. year old female with right knee pain. Patient states the pain is associated and worsened with activity. Patient complains of lateral sided and posterior knee pain. Patient reports periodic swelling and recurrent dislocation of patella.  In April of 2024, she had an episode where her knee locked in place.  She would difficulty in both bending it and straightening the knee.  This lasted for about 48 hours.  She had inability to ambulate.  She has since been working with physical therapy and her knee pain has been improved. Patient reports pain with deep knee flexion and has an ache that she especially feels at night. Patient states the pain is moderate. Patient has been treated with medication, injections, and bracing in the past. She is currently in therapy.     Past Medical History:   Diagnosis Date    Anxiety disorder, unspecified     Cyst of left ovary     Endometriosis, unspecified        Past Surgical History:   Procedure Laterality Date    CARPAL TUNNEL RELEASE Right 06/16/2020    COLONOSCOPY  12/23/2020    repeat 3 years,   [facility]    COLONOSCOPY  10/19/2023    Dr Jason Guillen--3 years --1 cm polyp    LAPAROSCOPIC SURGICAL REMOVAL OF CYST OF OVARY Left 2015    LAPAROSCOPIC TOTAL HYSTERECTOMY  12/2020    LAPAROSCOPY  1998    due to endometriosis    MYOMECTOMY  2015    TONSILLECTOMY  2006    TUMOR EXCISION  06/16/2020    from right thumb       Current Outpatient Medications   Medication Sig    EScitalopram oxalate (LEXAPRO) 10 MG tablet See Instructions, TAKE 1 TABLET BY MOUTH EVERY DAY, # 90 tab(s), 1 Refill(s), Pharmacy: [id number], 172, cm, Height/Length Dosing, 10/22/21 15:09:00 CDT, 123.9, kg, Weight Dosing, 10/22/21 15:09:00 CDT    omeprazole (PRILOSEC) 40 MG capsule Take 40 mg by mouth.     No current facility-administered medications for this visit.       Review of patient's allergies indicates:   Allergen Reactions    Adhesive Rash    Hydrocodone-acetaminophen Nausea And Vomiting       Family History   Problem Relation Name Age of Onset    Breast cancer Mother      Hypertension Mother      Kidney disease Mother          cyst    Colonic polyp Father      Diabetes Father      Hyperlipidemia Father      Hypertension Father      Liver disease Brother          4 months       Social History     Socioeconomic History    Marital status:     Number of children: 1   Occupational History    Occupation: Teacher   Tobacco Use    Smoking status: Never    Smokeless tobacco: Never   Substance and Sexual Activity    Alcohol use: Yes     Comment: occasionally    Drug use: Never    Sexual activity: Yes     Partners: Male       Review of Systems:    Constitution:   Denies chills, fever, and sweats.  HENT:   Denies headaches or blurry vision.  Cardiovascular:  Denies chest pain or irregular heart beat.  Respiratory:   Denies cough or shortness of breath.  Gastrointestinal:  Denies abdominal pain, nausea, or vomiting.  Musculoskeletal:   Denies muscle cramps.  Neurological:   Denies dizziness or focal  "weakness.  Psychiatric/Behavior: Normal mental status.  Hematology/Lymph:  Denies bleeding problem or easy bruising/bleeding.  Skin:    Denies rash or suspicious lesions.    Examination:    Vital Signs:    Vitals:    07/10/24 1457   BP: (!) 155/106   Pulse: 98   Weight: 132.9 kg (293 lb)   Height: 5' 8" (1.727 m)       Body mass index is 44.55 kg/m².    Constitution:   Well-developed, well nourished patient in no acute distress.  Neurological:   Alert and oriented x 3 and cooperative to examination.     Psychiatric/Behavior: Normal mental status.  Respiratory:   No shortness of breath.  Eyes:    Extraoccular muscles intact  Skin:    No scars, rash or suspicious lesions.    MSK:   Standing exam  stance: normal alignment, no significant leg-length discrepancy  gait: antalgic    Knee examination  - General comments: unremarkable appearance    - Tenderness:posterior lateral    Knee                  RIGHT    LEFT  Skin:                  Intact      Intact  ROM:                 5-130      0-130  Effusion:             +        Neg  MJL TTP:           Neg         Neg  LJL TTP:              +         Neg  Jamil:           +         Neg  Pat crep:              +         Neg  Patella TTPs:       +         Neg  Patella grind:        +        Neg  Lachman:           Neg        Neg  Pivot shift:          Neg        Neg  Valgus stress:    Neg        Neg  Varus stress:      Neg        Neg  Posterior drawer: Neg       Neg    N-V intact intact  Hip: nml nml    Lower extremity edema:Negative     Imaging: X-rays ordered and images interpreted today personally by me of four view of right knee shows moderate osteoarthritis with lateral subluxation of the patella.  and MRI of right knee shows lateral tracking of the patella with grade 4 chondral changes.  She has scattered chondromalacia throughout the knee.  She has what looks like a loose body in the posterolateral aspect of the knee.  She has degenerative meniscus tearing.   "   Assessment: Primary osteoarthritis of right knee  -     Ambulatory referral/consult to Orthopedics  -     X-Ray Knee Complete 4 Or More Views Right; Future; Expected date: 07/10/2024        Plan:  I think her best surgical option would be a total knee arthroplasty in the distant future.  She may benefit from an arthroscopic loose body removal.  I think at this point to try any kind of patella stabilization is unwarranted. Will send her a prescription of mobic to help with her pain. Discussed with her that she can try a corticosteroid injection, viscosupplemental injection or a PRP injection. Will continue with her physical therapy and try to transition to a home exercise program. Patient will return as needed or is wanting an injection.    Erik Romo MD personally performed the services described in this documentation, including but not limited to patient's history, physical examination, and assessment and plan of care. All medical record entries made by Briana Daly ATC, OTC were performed at his direction and in his presence. The medical record was reviewed and is accurate and complete.

## 2024-07-25 PROBLEM — E66.813 CLASS 3 OBESITY: Status: ACTIVE | Noted: 2024-07-25

## 2024-07-26 PROBLEM — I10 PRIMARY HYPERTENSION: Status: ACTIVE | Noted: 2024-07-26

## 2024-10-28 PROBLEM — Z00.00 ENCOUNTER FOR WELLNESS EXAMINATION IN ADULT: Status: RESOLVED | Noted: 2023-04-12 | Resolved: 2024-10-28

## 2024-11-07 ENCOUNTER — OFFICE VISIT (OUTPATIENT)
Dept: GYNECOLOGY | Facility: CLINIC | Age: 44
End: 2024-11-07
Payer: COMMERCIAL

## 2024-11-07 VITALS
RESPIRATION RATE: 18 BRPM | DIASTOLIC BLOOD PRESSURE: 88 MMHG | HEART RATE: 93 BPM | HEIGHT: 68 IN | BODY MASS INDEX: 42.74 KG/M2 | OXYGEN SATURATION: 95 % | TEMPERATURE: 98 F | SYSTOLIC BLOOD PRESSURE: 129 MMHG | WEIGHT: 282 LBS

## 2024-11-07 DIAGNOSIS — Z01.419 ROUTINE GYNECOLOGICAL EXAMINATION: Primary | ICD-10-CM

## 2024-11-07 DIAGNOSIS — Z12.31 VISIT FOR SCREENING MAMMOGRAM: ICD-10-CM

## 2024-11-07 PROCEDURE — 3079F DIAST BP 80-89 MM HG: CPT | Mod: CPTII,,, | Performed by: OBSTETRICS & GYNECOLOGY

## 2024-11-07 PROCEDURE — 4010F ACE/ARB THERAPY RXD/TAKEN: CPT | Mod: CPTII,,, | Performed by: OBSTETRICS & GYNECOLOGY

## 2024-11-07 PROCEDURE — 1160F RVW MEDS BY RX/DR IN RCRD: CPT | Mod: CPTII,,, | Performed by: OBSTETRICS & GYNECOLOGY

## 2024-11-07 PROCEDURE — 3008F BODY MASS INDEX DOCD: CPT | Mod: CPTII,,, | Performed by: OBSTETRICS & GYNECOLOGY

## 2024-11-07 PROCEDURE — 3074F SYST BP LT 130 MM HG: CPT | Mod: CPTII,,, | Performed by: OBSTETRICS & GYNECOLOGY

## 2024-11-07 PROCEDURE — 1159F MED LIST DOCD IN RCRD: CPT | Mod: CPTII,,, | Performed by: OBSTETRICS & GYNECOLOGY

## 2024-11-07 PROCEDURE — 99214 OFFICE O/P EST MOD 30 MIN: CPT | Mod: PBBFAC | Performed by: OBSTETRICS & GYNECOLOGY

## 2024-11-07 PROCEDURE — 99396 PREV VISIT EST AGE 40-64: CPT | Mod: S$PBB,,, | Performed by: OBSTETRICS & GYNECOLOGY

## 2024-11-07 NOTE — PROGRESS NOTES
Subjective:       Patient ID: Margarito Krishna is a 44 y.o. female.    Chief Complaint:  Well Woman    History of Present Illness:  Presents for gyn wellness visit without complaints.  Periods are regular without excessive bleeding or pain. She denies intermenstrual bleeding, vaginal discharge, breast masses or other changes of concern. Colonoscopy -2024.    GYN & OB History  No LMP recorded. Patient has had a hysterectomy.   Date of Last Pap: No result found    OB History    Para Term  AB Living   1 1       1   SAB IAB Ectopic Multiple Live Births           1      # Outcome Date GA Lbr Mike/2nd Weight Sex Type Anes PTL Lv   1 Para      Vag-Spont   SLICK       Past Medical History:   Diagnosis Date    Anxiety disorder, unspecified     Cyst of left ovary     Endometriosis, unspecified     HTN (hypertension)       Past Surgical History:   Procedure Laterality Date    CARPAL TUNNEL RELEASE Right 2020    COLONOSCOPY  2020    repeat 3 years, Dr. Rodríguez    COLONOSCOPY  10/19/2023    Dr Jason Guillen--3 years --1 cm polyp    LAPAROSCOPIC SURGICAL REMOVAL OF CYST OF OVARY Left     LAPAROSCOPIC TOTAL HYSTERECTOMY  2020    LAPAROSCOPY  1998    due to endometriosis    MYOMECTOMY      TONSILLECTOMY  2006    TUMOR EXCISION  2020    from right thumb        Current Outpatient Medications:     EScitalopram oxalate (LEXAPRO) 10 MG tablet, See Instructions, TAKE 1 TABLET BY MOUTH EVERY DAY, # 90 tab(s), 1 Refill(s), Pharmacy: [id number], 172, cm, Height/Length Dosing, 10/22/21 15:09:00 CDT, 123.9, kg, Weight Dosing, 10/22/21 15:09:00 CDT, Disp: 90 tablet, Rfl: 3    losartan (COZAAR) 50 MG tablet, TAKE 1 TABLET(50 MG) BY MOUTH DAILY, Disp: 30 tablet, Rfl: 11    meloxicam (MOBIC) 15 MG tablet, Take 1 tablet (15 mg total) by mouth once daily., Disp: 30 tablet, Rfl: 3     Review of patient's allergies indicates:   Allergen Reactions    Adhesive Rash    Hydrocodone-acetaminophen  Nausea And Vomiting        Social History     Socioeconomic History    Marital status:     Number of children: 1   Occupational History    Occupation: Teacher   Tobacco Use    Smoking status: Never    Smokeless tobacco: Never   Substance and Sexual Activity    Alcohol use: Yes     Comment: occasionally    Drug use: Never    Sexual activity: Yes     Partners: Male     Birth control/protection: See Surgical Hx        Immunization History   Administered Date(s) Administered    COVID-19, MRNA, LN-S, PF (Pfizer) (Purple Cap) 03/02/2021, 03/23/2021    Influenza - Quadrivalent 11/22/2017, 01/22/2021, 01/22/2021    Influenza - Quadrivalent - PF *Preferred* (6 months and older) 12/14/2016, 10/21/2019    Influenza - Trivalent - Fluarix, Flulaval, Fluzone, Afluria - PF 10/21/2019    Pneumococcal Polysaccharide - 23 Valent 10/24/2011    Tdap 04/27/2011, 07/01/2021        Health Maintenance Due   Topic Date Due    Influenza (Flu) Vaccine (1) 09/01/2024    COVID-19 Vaccine (3 - 2024-25 season) 09/01/2024        Review of Systems  Review of Systems       Objective:     Vitals:    11/07/24 1416   BP: 129/88   Pulse: 93   Resp: 18   Temp: 98.3 °F (36.8 °C)       Physical Exam:   Constitutional: She is oriented to person, place, and time. She appears well-developed and well-nourished.    HENT:   Head: Normocephalic and atraumatic.    Eyes: Pupils are equal, round, and reactive to light. Conjunctivae and EOM are normal.    Neck: No thyromegaly present.    Cardiovascular:  Normal rate, regular rhythm and normal heart sounds.            No murmur heard.   Pulmonary/Chest: Effort normal and breath sounds normal. She has no wheezes. She has no rales. She exhibits no mass and no edema. Right breast exhibits no mass, no nipple discharge, no skin change and no tenderness. Left breast exhibits no mass, no nipple discharge, no skin change and no tenderness.        Abdominal: Soft. She exhibits no distension and no mass. There is no  abdominal tenderness. No hernia.     Genitourinary:    Inguinal canal, right adnexa and left adnexa normal.   Rectum:      No external hemorrhoid.   The external female genitalia was normal.     Labial bartholins normal.There is no lesion on the right labia. There is no lesion on the left labia. Vagina exhibits no lesion. No erythema, vaginal discharge, tenderness, rectocele or cystocele in the vagina. Cervix is absent.Uterus is absent. Normal urethral meatus.Bladder findings: no bladder distention and no bladder tenderness             Lymphadenopathy:     She has no cervical adenopathy.    Neurological: She is alert and oriented to person, place, and time.    Skin: Skin is warm and dry. No rash noted.    Psychiatric: She has a normal mood and affect. Her behavior is normal.          Assessment:        1. Routine gynecological examination    2. Visit for screening mammogram                Plan:      Problem List Items Addressed This Visit    None  Visit Diagnoses       Routine gynecological examination    -  Primary    Visit for screening mammogram        Relevant Orders    Mammo Digital Screening Bilat w/ [facility]               Follow up in about 1 year (around 11/7/2025) for GYN Wellness.   SBE

## 2025-02-04 DIAGNOSIS — M17.11 PRIMARY OSTEOARTHRITIS OF RIGHT KNEE: Primary | ICD-10-CM

## 2025-02-04 RX ORDER — MELOXICAM 15 MG/1
TABLET ORAL
Qty: 30 TABLET | Refills: 3 | Status: SHIPPED | OUTPATIENT
Start: 2025-02-04